# Patient Record
Sex: FEMALE | Race: ASIAN | NOT HISPANIC OR LATINO | URBAN - METROPOLITAN AREA
[De-identification: names, ages, dates, MRNs, and addresses within clinical notes are randomized per-mention and may not be internally consistent; named-entity substitution may affect disease eponyms.]

---

## 2018-10-15 ENCOUNTER — EMERGENCY (EMERGENCY)
Facility: HOSPITAL | Age: 83
LOS: 1 days | Discharge: HOME | End: 2018-10-15
Attending: EMERGENCY MEDICINE | Admitting: EMERGENCY MEDICINE
Payer: MEDICARE

## 2018-10-15 VITALS
RESPIRATION RATE: 16 BRPM | HEART RATE: 78 BPM | OXYGEN SATURATION: 100 % | DIASTOLIC BLOOD PRESSURE: 72 MMHG | SYSTOLIC BLOOD PRESSURE: 143 MMHG | TEMPERATURE: 98 F

## 2018-10-15 LAB
ALBUMIN SERPL ELPH-MCNC: 4.6 G/DL — SIGNIFICANT CHANGE UP (ref 3.3–5)
ALP SERPL-CCNC: 82 U/L — SIGNIFICANT CHANGE UP (ref 40–120)
ALT FLD-CCNC: 18 U/L — SIGNIFICANT CHANGE UP (ref 4–33)
APPEARANCE UR: CLEAR — SIGNIFICANT CHANGE UP
AST SERPL-CCNC: 18 U/L — SIGNIFICANT CHANGE UP (ref 4–32)
BASOPHILS # BLD AUTO: 0.03 K/UL — SIGNIFICANT CHANGE UP (ref 0–0.2)
BASOPHILS NFR BLD AUTO: 0.2 % — SIGNIFICANT CHANGE UP (ref 0–2)
BILIRUB SERPL-MCNC: 0.3 MG/DL — SIGNIFICANT CHANGE UP (ref 0.2–1.2)
BILIRUB UR-MCNC: NEGATIVE — SIGNIFICANT CHANGE UP
BLOOD UR QL VISUAL: NEGATIVE — SIGNIFICANT CHANGE UP
BUN SERPL-MCNC: 16 MG/DL — SIGNIFICANT CHANGE UP (ref 7–23)
CALCIUM SERPL-MCNC: 9.9 MG/DL — SIGNIFICANT CHANGE UP (ref 8.4–10.5)
CHLORIDE SERPL-SCNC: 93 MMOL/L — LOW (ref 98–107)
CO2 SERPL-SCNC: 20 MMOL/L — LOW (ref 22–31)
COLOR SPEC: SIGNIFICANT CHANGE UP
CREAT SERPL-MCNC: 0.72 MG/DL — SIGNIFICANT CHANGE UP (ref 0.5–1.3)
EOSINOPHIL # BLD AUTO: 0.14 K/UL — SIGNIFICANT CHANGE UP (ref 0–0.5)
EOSINOPHIL NFR BLD AUTO: 1.1 % — SIGNIFICANT CHANGE UP (ref 0–6)
GLUCOSE SERPL-MCNC: 143 MG/DL — HIGH (ref 70–99)
GLUCOSE UR-MCNC: NEGATIVE — SIGNIFICANT CHANGE UP
HCT VFR BLD CALC: 35.6 % — SIGNIFICANT CHANGE UP (ref 34.5–45)
HGB BLD-MCNC: 12.1 G/DL — SIGNIFICANT CHANGE UP (ref 11.5–15.5)
IMM GRANULOCYTES # BLD AUTO: 0.07 # — SIGNIFICANT CHANGE UP
IMM GRANULOCYTES NFR BLD AUTO: 0.5 % — SIGNIFICANT CHANGE UP (ref 0–1.5)
KETONES UR-MCNC: NEGATIVE — SIGNIFICANT CHANGE UP
LEUKOCYTE ESTERASE UR-ACNC: NEGATIVE — SIGNIFICANT CHANGE UP
LYMPHOCYTES # BLD AUTO: 1.13 K/UL — SIGNIFICANT CHANGE UP (ref 1–3.3)
LYMPHOCYTES # BLD AUTO: 8.5 % — LOW (ref 13–44)
MCHC RBC-ENTMCNC: 30.6 PG — SIGNIFICANT CHANGE UP (ref 27–34)
MCHC RBC-ENTMCNC: 34 % — SIGNIFICANT CHANGE UP (ref 32–36)
MCV RBC AUTO: 90.1 FL — SIGNIFICANT CHANGE UP (ref 80–100)
MONOCYTES # BLD AUTO: 0.82 K/UL — SIGNIFICANT CHANGE UP (ref 0–0.9)
MONOCYTES NFR BLD AUTO: 6.2 % — SIGNIFICANT CHANGE UP (ref 2–14)
NEUTROPHILS # BLD AUTO: 11.03 K/UL — HIGH (ref 1.8–7.4)
NEUTROPHILS NFR BLD AUTO: 83.5 % — HIGH (ref 43–77)
NITRITE UR-MCNC: NEGATIVE — SIGNIFICANT CHANGE UP
NRBC # FLD: 0 — SIGNIFICANT CHANGE UP
PH UR: 7 — SIGNIFICANT CHANGE UP (ref 5–8)
PLATELET # BLD AUTO: 291 K/UL — SIGNIFICANT CHANGE UP (ref 150–400)
PMV BLD: 10.4 FL — SIGNIFICANT CHANGE UP (ref 7–13)
POTASSIUM SERPL-MCNC: 5 MMOL/L — SIGNIFICANT CHANGE UP (ref 3.5–5.3)
POTASSIUM SERPL-SCNC: 5 MMOL/L — SIGNIFICANT CHANGE UP (ref 3.5–5.3)
PROT SERPL-MCNC: 8.1 G/DL — SIGNIFICANT CHANGE UP (ref 6–8.3)
PROT UR-MCNC: 10 — SIGNIFICANT CHANGE UP
RBC # BLD: 3.95 M/UL — SIGNIFICANT CHANGE UP (ref 3.8–5.2)
RBC # FLD: 12.6 % — SIGNIFICANT CHANGE UP (ref 10.3–14.5)
SODIUM SERPL-SCNC: 132 MMOL/L — LOW (ref 135–145)
SP GR SPEC: 1.01 — SIGNIFICANT CHANGE UP (ref 1–1.04)
TROPONIN T, HIGH SENSITIVITY: 10 NG/L — SIGNIFICANT CHANGE UP (ref ?–14)
TROPONIN T, HIGH SENSITIVITY: 9 NG/L — SIGNIFICANT CHANGE UP (ref ?–14)
UROBILINOGEN FLD QL: NORMAL — SIGNIFICANT CHANGE UP
WBC # BLD: 13.22 K/UL — HIGH (ref 3.8–10.5)
WBC # FLD AUTO: 13.22 K/UL — HIGH (ref 3.8–10.5)

## 2018-10-15 PROCEDURE — 70450 CT HEAD/BRAIN W/O DYE: CPT | Mod: 26

## 2018-10-15 PROCEDURE — 71045 X-RAY EXAM CHEST 1 VIEW: CPT | Mod: 26

## 2018-10-15 PROCEDURE — 93880 EXTRACRANIAL BILAT STUDY: CPT | Mod: 26

## 2018-10-15 PROCEDURE — 99218: CPT | Mod: GC

## 2018-10-15 RX ORDER — METFORMIN HYDROCHLORIDE 850 MG/1
500 TABLET ORAL THREE TIMES A DAY
Qty: 0 | Refills: 0 | Status: DISCONTINUED | OUTPATIENT
Start: 2018-10-15 | End: 2018-10-19

## 2018-10-15 RX ORDER — LOSARTAN POTASSIUM 100 MG/1
25 TABLET, FILM COATED ORAL
Qty: 0 | Refills: 0 | Status: DISCONTINUED | OUTPATIENT
Start: 2018-10-15 | End: 2018-10-19

## 2018-10-15 RX ORDER — SODIUM CHLORIDE 9 MG/ML
1000 INJECTION INTRAMUSCULAR; INTRAVENOUS; SUBCUTANEOUS ONCE
Qty: 0 | Refills: 0 | Status: COMPLETED | OUTPATIENT
Start: 2018-10-15 | End: 2018-10-15

## 2018-10-15 RX ADMIN — SODIUM CHLORIDE 1000 MILLILITER(S): 9 INJECTION INTRAMUSCULAR; INTRAVENOUS; SUBCUTANEOUS at 14:50

## 2018-10-15 RX ADMIN — LOSARTAN POTASSIUM 25 MILLIGRAM(S): 100 TABLET, FILM COATED ORAL at 19:41

## 2018-10-15 NOTE — ED ADULT NURSE NOTE - OBJECTIVE STATEMENT
Pt to bed 27. Alert and oriented x 3. Pt states that she felt dizzy and slid herself to the floor. Denies any LOC. Pt family at bedside. Bloods drawn. Safety maintained. Will continue to monitor.

## 2018-10-15 NOTE — ED ADULT NURSE NOTE - NSIMPLEMENTINTERV_GEN_ALL_ED
Implemented All Fall with Harm Risk Interventions:  [place] to call system. Call bell, personal items and telephone within reach. Instruct patient to call for assistance. Room bathroom lighting operational. Non-slip footwear when patient is off stretcher. Physically safe environment: no spills, clutter or unnecessary equipment. Stretcher in lowest position, wheels locked, appropriate side rails in place. Provide visual cue, wrist band, yellow gown, etc. Monitor gait and stability. Monitor for mental status changes and reorient to person, place, and time. Review medications for side effects contributing to fall risk. Reinforce activity limits and safety measures with patient and family. Provide visual clues: red socks.

## 2018-10-15 NOTE — ED PROVIDER NOTE - OBJECTIVE STATEMENT
84y f w PMhx HTN, DM (on metformin), CAD (no hx of MI or stents, not on AC), p/w an episode of syncope earlier this afternoon. Pt was in the kitchen and stood up quickly; she then began to feel dizzy and sat down on the floor, after which she lost consciousness for several minutes. She does not remember this happening; per family she also lost continence and defecated on herself during the episode. After awakening she had no post-ictal period. She denies and CP or SOB during or after the incident, denies any headache then or now. She has ambulated since the event.

## 2018-10-15 NOTE — ED PROVIDER NOTE - MEDICAL DECISION MAKING DETAILS
Syncopal episode, likely orthostatic, no associated headache, visual changes, CP, or SOB. Pt now asymptomatic. If all studies WNL will suggest CDU for echo in setting of syncope w/u, as pt has not had a cardiac evaluation in past -Jannet

## 2018-10-15 NOTE — ED CDU PROVIDER INITIAL DAY NOTE - ATTENDING CONTRIBUTION TO CARE
MD Kruse:  I have personally performed a face to face diagnostic evaluation on this patient with the PA.  I have reviewed the ACP note and agree with the history, exam, and plan of care, except as noted.  History and Exam by me shows 83 yo F syncope this AM.  Context: unwitnessed.  Duration LOC "7 minutes."  Associated Sx: no cp/sob, no HA, no weakness/numbness, no f/c, no abd pain, no back pain.  Better/worse:  feels fine now.  Quality:  of note, patient experienced a similar episode 4yrs ago, with no clear etiology.  In the ED he underwent cbc, cmp, UA, delta-trop (< 3), CT head (prelim result pending), carotid duplexes (result pending).  VS: wnl. Physical Exam: adult F, NAD, NCAT, neck supple, CTA B, RRR +systolic murmur.  Abd: s/nd/nt. Ext: no edema.  impression:  syncope of unclear etiology, possible aortic stenosis.  Plan:  TTE, tele, f/u carotid duplex and CT results, reassess.

## 2018-10-15 NOTE — ED ADULT TRIAGE NOTE - CHIEF COMPLAINT QUOTE
Pt s/o syncopal episode at home while sitting, pt states that she felt dizzy prior to episode. Pt denies passing out but family states that she did. PMH HTN high lipid, DM

## 2018-10-15 NOTE — ED CDU PROVIDER INITIAL DAY NOTE - OBJECTIVE STATEMENT
84y f w PMhx HTN, DM (on metformin), CAD (no hx of MI or stents, not on AC), p/w an episode of syncope earlier this afternoon. Pt was in the kitchen and stood up quickly; she then began to feel dizzy and sat down on the floor, after which she lost consciousness for several minutes. She does not remember this happening; per family she also lost continence and defecated on herself during the episode. After awakening she had no post-ictal period. She denies and CP or SOB during or after the incident, denies any headache then or now. She has ambulated since the event.    CDU SHARON Hernandez: Agree with above, 85 Y/O F PMH HTN DM CAD presented with syncopal episode. Pt states she stood up quickly, pt lost consciousness for several minutes as per family, pt denies LOC. Pt denies trauma, pt states she feels well. Pt to CDU for echo, overnight telemtry and continued evaluation.

## 2018-10-15 NOTE — ED CDU PROVIDER INITIAL DAY NOTE - MEDICAL DECISION MAKING DETAILS
impression:  prolonged syncope of unclear etiology, possible aortic stenosis.    Plan:  TTE, tele, f/u carotid duplex and CT results, reassess.

## 2018-10-16 VITALS
HEART RATE: 80 BPM | RESPIRATION RATE: 16 BRPM | SYSTOLIC BLOOD PRESSURE: 154 MMHG | TEMPERATURE: 98 F | DIASTOLIC BLOOD PRESSURE: 63 MMHG | OXYGEN SATURATION: 98 %

## 2018-10-16 PROCEDURE — 93306 TTE W/DOPPLER COMPLETE: CPT | Mod: 26

## 2018-10-16 PROCEDURE — 99217: CPT | Mod: GC

## 2018-10-16 RX ADMIN — LOSARTAN POTASSIUM 25 MILLIGRAM(S): 100 TABLET, FILM COATED ORAL at 10:15

## 2018-10-16 RX ADMIN — METFORMIN HYDROCHLORIDE 500 MILLIGRAM(S): 850 TABLET ORAL at 10:15

## 2018-10-16 NOTE — ED CDU PROVIDER SUBSEQUENT DAY NOTE - ATTENDING CONTRIBUTION TO CARE
Dr. Ty: I performed a face to face bedside interview with patient regarding history of present illness, review of symptoms and past medical history. I completed an independent physical exam.  I have discussed patient's plan of care with PA.   I agree with note as stated above, having amended the EMR as needed to reflect my findings.   This includes HISTORY OF PRESENT ILLNESS, HIV, PAST MEDICAL/SURGICAL/FAMILY/SOCIAL HISTORY, ALLERGIES AND HOME MEDICATIONS, REVIEW OF SYSTEMS, PHYSICAL EXAM, and any PROGRESS NOTES during the time I functioned as the attending physician for this patient. 84F h/o htn, hld, dm, cad no stents s/p syncope v. near syncope. Yesterday afternoon patient in kitchen felt dizzy, sat down on floor, per family patient loc. Neg incontinence or post ictal symptoms. Yest carotid duplex neg. CDU for tele monitoring and echo. Overnight neg issues. Dr. Ty: I performed a face to face bedside interview with patient regarding history of present illness, review of symptoms and past medical history. I completed an independent physical exam.  I have discussed patient's plan of care with PA.   I agree with note as stated above, having amended the EMR as needed to reflect my findings.   This includes HISTORY OF PRESENT ILLNESS, HIV, PAST MEDICAL/SURGICAL/FAMILY/SOCIAL HISTORY, ALLERGIES AND HOME MEDICATIONS, REVIEW OF SYSTEMS, PHYSICAL EXAM, and any PROGRESS NOTES during the time I functioned as the attending physician for this patient. 84F h/o htn, hld, dm, cad no stents s/p syncope v. near syncope. Yesterday afternoon patient in kitchen felt dizzy, spinning, sat down on floor, per family patient loc. Neg incontinence or post ictal symptoms. Yest carotid duplex neg. CDU for tele monitoring and echo. Overnight neg issues. AM neg complaints and unremarkable exam.

## 2018-10-16 NOTE — ED CDU PROVIDER DISPOSITION NOTE - CLINICAL COURSE
84F h/o htn, hld, dm, cad no stents s/p syncope v. near syncope. Yesterday afternoon patient in kitchen felt dizzy, spinning, sat down on floor, per family patient loc. Neg incontinence or post ictal symptoms. Yest carotid duplex neg. CDU for tele monitoring and echo. Overnight neg issues. Unremarkable PE. Cardiac echo indicates mild to moderate aortic stenosis. PCP Tarun Truong made aware. Results, dc instructions, return precautions, and need for follow-up reviewed with patient. Stable dc home.

## 2018-10-16 NOTE — ED CDU PROVIDER DISPOSITION NOTE - PLAN OF CARE
Advance activity as tolerated.  Continue all previously prescribed medications as directed unless otherwise instructed.  Follow up with your primary care physician and cardiology (referral list provided)  in 48-72 hours- bring copies of your results.  Return to the ER for worsening or persistent symptoms, including but not limited to worsening/persistent lightheadedness, chest pain, palpitations, passing out, and/or ANY NEW OR CONCERNING SYMPTOMS. If you have issues obtaining follow up, please call: [phone] to obtain a doctor or specialist who takes your insurance in your area.  You may call [phone] to make an appointment with the internal medicine clinic.

## 2018-10-16 NOTE — ED CDU PROVIDER SUBSEQUENT DAY NOTE - PROGRESS NOTE DETAILS
Pt notes feeling well.  Pt denies any recurrence of lightheadedness.  Denies any chest pain, SOB, palpitations.  Echo shows mild to moderate aortic stenosis with EF 66%.  Discussed results with pt and pt's pmd Dr. Blancas who agrees with outpatient cardiology follow up.  Pt medically stable for discharge.  Pt to follow up with PMD and cardiology (referral list provided).

## 2018-10-16 NOTE — ED CDU PROVIDER SUBSEQUENT DAY NOTE - PMH
Diabetes    HLD (hyperlipidemia)    HTN (hypertension) Coronary artery disease    Diabetes    HLD (hyperlipidemia)    HTN (hypertension)

## 2018-10-16 NOTE — ED CDU PROVIDER SUBSEQUENT DAY NOTE - HISTORY
84F h/o htn, hld, dm, cad no stents s/p syncope v. near syncope. Yesterday afternoon patient in kitchen felt dizzy, sat down on floor, per family patient loc. Neg incontinence or post ictal symptoms. Yest carotid duplex neg. CDU for tele monitoring and echo. Overnight neg issues. 84F h/o htn, hld, dm, cad no stents s/p syncope v. near syncope. Yesterday afternoon patient in kitchen felt dizzy, sat down on floor, per family patient loc. Neg incontinence or post ictal symptoms. Yest carotid duplex neg. CDU for tele monitoring and echo. Overnight neg issues.    CDU SHARON Allen Note----  85 yo female, PMH of CAD, DM, HTN, hyperlipidemia, who presented to the ED s/p syncopal episode (pt had stood up quickly, felt dizzy, sat down on floor, and then had syncopal episode.  No incontinence of bowel or bladder at the time; family witnessed event; no postictal period described.  Pt. was evaluated in the ED and dispo'd to CDU for further management plan:  tele monitoring, carotid duplex, echo, general observation care / monitoring.  In the interim, carotid duplex showed "Mild heterogenous plaque noted within the proximal right and left internal carotid arteries, consistent with 16-49% stenoses in both proximal vessels.  No hemodynamically significant stenoses noted.", no issues or c/o in the interim from 10/15 evening sign-out to the present time thus far.

## 2018-10-16 NOTE — ED CDU PROVIDER DISPOSITION NOTE - ATTENDING CONTRIBUTION TO CARE
Dr. Ty: I performed a face to face bedside interview with patient regarding history of present illness, review of symptoms and past medical history. I completed an independent physical exam.  I have discussed patient's plan of care with PA.   I agree with note as stated above, having amended the EMR as needed to reflect my findings.   This includes HISTORY OF PRESENT ILLNESS, HIV, PAST MEDICAL/SURGICAL/FAMILY/SOCIAL HISTORY, ALLERGIES AND HOME MEDICATIONS, REVIEW OF SYSTEMS, PHYSICAL EXAM, and any PROGRESS NOTES during the time I functioned as the attending physician for this patient. 84F h/o htn, hld, dm, cad no stents s/p syncope v. near syncope. Yesterday afternoon patient in kitchen felt dizzy, spinning, sat down on floor, per family patient loc. Neg incontinence or post ictal symptoms. Yest carotid duplex neg. CDU for tele monitoring and echo. Overnight neg issues. Unremarkable PE. Cardiac echo indicates mild to moderate aortic stenosis. PCP Tarun Truong made aware. Results, dc instructions, return precautions, and need for follow-up reviewed with patient. Stable dc home.

## 2018-10-17 LAB
BACTERIA UR CULT: SIGNIFICANT CHANGE UP
SPECIMEN SOURCE: SIGNIFICANT CHANGE UP

## 2022-06-11 NOTE — ED PROVIDER NOTE - CONSTITUTIONAL, MLM
normal... Well appearing, well nourished, awake, alert, oriented to person, place, time/situation and in no apparent distress. [facility]

## 2023-06-29 NOTE — ED PROVIDER NOTE - ATTENDING CONTRIBUTION TO CARE
At least 3/5 B/L UE and 3-/5 BLE/grossly assessed due to
junior: pt had episode of syncope today along with uncontrolled defecation. denies trauma.  brought to ED.   had episode of syncope several years ago with inpt w/u.  exam: slightly unsteady gait (family states this is baseline and pt is supposed to be walking with a cane.  exam otherwise unremarkable.   impression; syncope. recc hospitalization for observation and further testing.

## 2024-08-27 NOTE — ED CDU PROVIDER DISPOSITION NOTE - NS ED ATTENDING STATEMENT MOD
[facility]    Consult               Today's Date: 8/27/2024  Patient Name: Krystina Han  Date of admission: 8/26/2024  2:22 PM  Patient's age: 70 y.o., 1953  Admission Dx: Syncope and collapse [R55]  Syncope, near [R55]    Requesting Physician: Andi Nation MD    Cardiac Evaluation Reason:  Syncope    History Obtained From: patient and chart review     History of Present Illness:    Patient 70 y.o. Female with past medical history of HTN and HLP being consulted for pre-syncopal episode that occurred yesterday. Patient states she was at her podiatrist office for 1 week surgical follow-up of hammertoe correction yesterday, when suddenly while seated her vision became blurry and \"felt like blacking out\". Patient denies trying to stand from sitting or moving before insidious onset of blurry vision and near-syncope. Patient states loss of consciousness 1 week ago while at a restaurant while seated, witnessed by her family. Patient endorses prodromal of narrowing and blurry vision before syncope 1 week ago. Patient denies aggravating and alleviating factors of 2 prior syncopal episodes. Patient endorsed blurry vision and feeling warm yesterday during pre-syncopal, and history of dizziness upon standing occasionally. Patient denies headache, change in hearing, diaphoresis, fatigue, palpitations, tachycardia, edema, coughing, wheezing, N/V/D/C, abdominal pain, paresthesias, and numbness.    Past Medical History:   has a past medical history of Head injury, Hearing loss, Hypertension, PONV (postoperative nausea and vomiting), Prolonged emergence from general anesthesia, and Shingles.    Past Surgical History:   has a past surgical history that includes Breast biopsy (Left, 2013); Breast lumpectomy (Left, 2013); Cholecystectomy; Hysterectomy; [place] tooth extraction; Colonoscopy; eye surgery (Bilateral, 05/25/2021); Foot surgery (Right, 8/19/2024); and Nail Surgery (Right, 8/19/2024).     Home  surgery on 08/19/2024 for correction of R 2nd phalange hammertoe, reduce weight bearing on R leg. No weakness or joint complaints.  Neurological: No headache, diplopia, change in muscle strength, numbness or tingling. No change in gate.   Endocrine: No temperature intolerance. No excessive thirst, fluid intake, or urination. No tremor.  Hematologic/Lymphatic: No abnormal bruising or bleeding    PHYSICAL EXAM:      BP (!) 144/86   Pulse 56   Temp 97.7 °F (36.5 °C) (Oral)   Resp 18   Ht 1.676 m (5' 6\")   Wt 78.5 kg (173 lb)   SpO2 100%   BMI 27.92 kg/m²    Constitutional and General Appearance: alert, cooperative, in no distress   HEENT: atraumatic, normocephalic.   Respiratory:  Clear to auscultation bilaterally  Cardiovascular:  Regular S1 and S2.  No JVD  Radial pulses are symmetrical and full   Abdomen:   Soft, non tender   Bowel sounds present  Extremities:  R Le with ace-bandage from inferior third of shin to MTPs, surgical fixated 2nd phalange  Neurological:  Deferred     LABS:   CBC:   Recent Labs     08/26/24  1440   WBC 7.9   HGB 12.0   HCT 35.7*        BMP:   Recent Labs     08/26/24  1440      K 3.4*   CO2 26   BUN 18   CREATININE 1.3*   LABGLOM 43*   GLUCOSE 119*     BNP: No results for input(s): \"BNP\" in the last 72 hours.  PT/INR: No results for input(s): \"PROTIME\", \"INR\" in the last 72 hours.  APTT:No results for input(s): \"APTT\" in the last 72 hours.  CARDIAC ENZYMES: Troponin negative times 2 (8 ng/L, 9 ng/L).    DATA:    EKG:   Sinus bradycardia, LAE, LVH, t-wave abnormalities, abnormal ECG    CXR:   Obtained and reviewed   (08/26/2024).  No acute cardiopulmonary process.    ECHO:   obtained and reviewed.   (02/27/2019)  · Normally contractile left ventricle.   · There are no significant valve abnormalities.   · There is no evidence of atrial level shunt.     Stress Test:   not obtained.    Cardiac Angiography:   not obtained.      ASSESSMENT:  Syncope/pre-syncopal  I have personally performed a face to face diagnostic evaluation on this patient. I have reviewed the ACP note and agree with the history, exam and plan of care, except as noted.

## 2024-11-01 ENCOUNTER — EMERGENCY (EMERGENCY)
Facility: HOSPITAL | Age: 89
LOS: 1 days | Discharge: HOME | End: 2024-11-01
Attending: STUDENT IN AN ORGANIZED HEALTH CARE EDUCATION/TRAINING PROGRAM | Admitting: STUDENT IN AN ORGANIZED HEALTH CARE EDUCATION/TRAINING PROGRAM
Payer: MEDICARE

## 2024-11-01 VITALS
RESPIRATION RATE: 18 BRPM | HEART RATE: 95 BPM | TEMPERATURE: 97 F | WEIGHT: 100.09 LBS | DIASTOLIC BLOOD PRESSURE: 75 MMHG | OXYGEN SATURATION: 98 % | SYSTOLIC BLOOD PRESSURE: 178 MMHG

## 2024-11-01 LAB
HCT VFR BLD CALC: 25 % — LOW (ref 34.5–45)
HGB BLD-MCNC: 8.6 G/DL — LOW (ref 11.5–15.5)
IANC: 9.02 K/UL — HIGH (ref 1.8–7.4)
MCHC RBC-ENTMCNC: 30.6 PG — SIGNIFICANT CHANGE UP (ref 27–34)
MCHC RBC-ENTMCNC: 34.4 G/DL — SIGNIFICANT CHANGE UP (ref 32–36)
MCV RBC AUTO: 89 FL — SIGNIFICANT CHANGE UP (ref 80–100)
PLATELET # BLD AUTO: 304 K/UL — SIGNIFICANT CHANGE UP (ref 150–400)
RBC # BLD: 2.81 M/UL — LOW (ref 3.8–5.2)
RBC # FLD: 13.2 % — SIGNIFICANT CHANGE UP (ref 10.3–14.5)
WBC # BLD: 11.35 K/UL — HIGH (ref 3.8–10.5)
WBC # FLD AUTO: 11.35 K/UL — HIGH (ref 3.8–10.5)

## 2024-11-01 PROCEDURE — 72170 X-RAY EXAM OF PELVIS: CPT | Mod: 26

## 2024-11-01 PROCEDURE — 93010 ELECTROCARDIOGRAM REPORT: CPT

## 2024-11-01 PROCEDURE — 99285 EMERGENCY DEPT VISIT HI MDM: CPT

## 2024-11-01 PROCEDURE — 71045 X-RAY EXAM CHEST 1 VIEW: CPT | Mod: 26

## 2024-11-01 RX ORDER — ACETAMINOPHEN 500 MG
675 TABLET ORAL ONCE
Refills: 0 | Status: COMPLETED | OUTPATIENT
Start: 2024-11-01 | End: 2024-11-01

## 2024-11-01 RX ORDER — QUETIAPINE FUMARATE 200 MG/1
50 TABLET ORAL ONCE
Refills: 0 | Status: COMPLETED | OUTPATIENT
Start: 2024-11-01 | End: 2024-11-01

## 2024-11-01 RX ADMIN — QUETIAPINE FUMARATE 50 MILLIGRAM(S): 200 TABLET ORAL at 23:56

## 2024-11-01 RX ADMIN — Medication 270 MILLIGRAM(S): at 22:43

## 2024-11-01 NOTE — ED PROVIDER NOTE - PROGRESS NOTE DETAILS
Светлана Clement MD (PGY-3 EM): discussed Hg, patient baseline anemic, takes iron, will fu with pmd, discussed Na, patient take sodium tablets, will follow up with pmd. patient will fu with cards for possible syncope.

## 2024-11-01 NOTE — ED PROVIDER NOTE - PATIENT PORTAL LINK FT
You can access the FollowMyHealth Patient Portal offered by [facility] [place] by registering at the following website: [email or web address]. By joining [employer]’s FollowMyHealth portal, you will also be able to view your health information using other applications (apps) compatible with our system.

## 2024-11-01 NOTE — ED PROVIDER NOTE - NSFOLLOWUPINSTRUCTIONS_ED_ALL_ED_FT
please follow up with your cardiologist within a week,     Concussion    WHAT YOU NEED TO KNOW:    A concussion is a mild brain injury. It is usually caused by a bump or blow to the head from a fall, a motor vehicle crash, or a sports injury. Sometimes being shaken forcefully may cause a concussion.    DISCHARGE INSTRUCTIONS:    Have someone call 911 for any of the following:   •You cannot be woken.  •You have a seizure, increasing confusion, or a change in personality.  •Your speech becomes slurred, or you have new vision problems.      Seek care immediately if:   •You have sudden and new vision problems.  •You have a severe headache that does not go away.  •You have arm or leg weakness, numbness, or new problems with coordination.  •You have blood or clear fluid coming out of the ears or nose.      Contact your healthcare provider if:   •You have nausea or are vomiting.  •You feel more sleepy than usual.  •Your symptoms get worse.  •Your symptoms last longer than 6 weeks after the injury.  •You have questions or concerns about your condition or care.      Medicines: You may need any of the following:   •Acetaminophen decreases pain and fever. It is available without a doctor's order. Ask how much to take and how often to take it. Follow directions. Read the labels of all other medicines you are using to see if they also contain acetaminophen, or ask your doctor or pharmacist. Acetaminophen can cause liver damage if not taken correctly. Do not use more than 4 grams (4,000 milligrams) total of acetaminophen in one day.   •NSAIDs help decrease swelling and pain or fever. This medicine is available with or without a doctor's order. NSAIDs can cause stomach bleeding or kidney problems in certain people. If you take blood thinner medicine, always ask your healthcare provider if NSAIDs are safe for you. Always read the medicine label and follow directions.  •Take your medicine as directed. Contact your healthcare provider if you think your medicine is not helping or if you have side effects. Tell him or her if you are allergic to any medicine. Keep a list of the medicines, vitamins, and herbs you take. Include the amounts, and when and why you take them. Bring the list or the pill bottles to follow-up visits. Carry your medicine list with you in case of an emergency.      Self-care: Concussion symptoms usually go away within about 10 days, but they may last longer. The following may be recommended to manage your symptoms:   •Rest from physical and mental activities as directed. Mental activities are those that require thinking, concentration, and attention. You will need to rest until your symptoms are gone. Rest will allow you to recover from your concussion. Ask your healthcare provider when you can return to work and other daily activities.  •Have someone stay with you for the first 24 hours after your injury. Your healthcare provider should be contacted if your symptoms get worse, or you develop new symptoms.  •Do not participate in sports and physical activities until your healthcare provider says it is okay. They could make your symptoms worse or lead to another concussion. Your healthcare provider will tell you when it is okay for you to return to sports or physical activities. Ask for more information about sports concussions.    Prevent another concussion:   •Wear protective sports equipment that fits properly. Helmets help decrease your risk for a serious brain injury. Talk to your healthcare provider about ways that can decrease your risk for a concussion if you play sports.  •Wear your seatbelt every time you travel. This helps to decrease your risk for a head injury if you are in a car accident.       Advance activity as tolerated.  Continue all previously prescribed medications as directed unless otherwise instructed.  Follow up with your primary care physician in 48-72 hours- bring copies of your results.  Return to the ER for worsening or persistent symptoms, and/or ANY NEW OR CONCERNING SYMPTOMS. If you have issues obtaining follow up, please call: [phone] to obtain a doctor or specialist who takes your insurance in your area.  You may call [phone] to make an appointment with the internal medicine clinic.    Concussion Program	Concussion phone number: [phone]

## 2024-11-01 NOTE — ED ADULT TRIAGE NOTE - CHIEF COMPLAINT QUOTE
(ems gave 300cc IVF# 20 left a/c in field) b/p in field 62/32 b/p went up to 172/78  + covid 2 weeks ago   baseline of confusion pt is more alert and has hx of dementia  f/s in field 242

## 2024-11-01 NOTE — ED PROVIDER NOTE - CLINICAL SUMMARY MEDICAL DECISION MAKING FREE TEXT BOX
89y/o female past medical history significant for diabetes, CAD, not on aspirin), hypertension presents emergency department status post fall.  As per daughter, patient got up was mildly confused, fell and hit her head.  Patient did not lose consciousness.  Patient has no current complaint.  Patient has no visual changes, nausea, vomiting. patient on on AC/ASA. patient has history of syncope, has had workup w/ cards who recommended further and "implantation of device", in which she decline. patient and family agreeable to imaging and labs, however does not want further syncope workup, PE: alert and oriented, echymosis to R side of face, w/ R sided Subconjunctival hemorrhage, no palpable occipital hematoma, no midline spine tenderness, no chest wall tenderness, pelvis intact, ext atraumatic w/ full ROM w/ ecchymosis to R anterior hand, non tender. lungs CTA, cardiac regular rhythm. will get CTH and cervical to eval frx vs ICH. will get screening labs and XR of chest/ pelvis, dispo pending workup.
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
## 2024-11-01 NOTE — ED ADULT NURSE NOTE - OBJECTIVE STATEMENT
Pt received to room 8 a&ox2, ambulatory at baseline s/p fall on asa and hypotension. Pmh - DM, HTN, HLD, CAD. On assessment pt noted to have large facial ecchymosis to b/l eyes, forehead, and right shoulder. Pts neice at baseside. Pt received to room 8 a&ox2, ambulatory at baseline s/p fall on asa and hypotension. Pmh - dementia, DM, HTN, HLD, CAD. On assessment pt noted to have large facial ecchymosis to b/l eyes, forehead, and right shoulder. Pts niece at bedside states she had fall last week and was seen by PCP for symptoms. Respirations even and unlabored. Pt placed on cardiac monitor. NSR noted. Abdomen soft, nondistended, nontender to touch. Skin warm, dry, and intact. No edema noted. Pt denies chest pain, shortness of breath, nausea, vomiting, fevers, chills.  20g IV placed to left AC. Labs collected and sent. Will continue to monitor.

## 2024-11-01 NOTE — ED PROVIDER NOTE - NSICDXPASTMEDICALHX_GEN_ALL_CORE_FT
PAST MEDICAL HISTORY:  Coronary artery disease     Diabetes     HLD (hyperlipidemia)     HTN (hypertension)

## 2024-11-01 NOTE — ED ADULT NURSE NOTE - NSFALLHARMRISKINTERV_ED_ALL_ED
Assistance OOB with selected safe patient handling equipment if applicable/Assistance with ambulation/Communicate risk of Fall with Harm to all staff, patient, and family/Monitor for mental status changes and reorient to person, place, and time, as needed/Move patient closer to nursing station/within visual sight of ED staff/Provide visual cue: red socks, yellow wristband, yellow gown, etc/Reinforce activity limits and safety measures with patient and family/Toileting schedule using arm’s reach rule for commode and bathroom/Use of alarms - bed, stretcher, chair and/or video monitoring/Bed in lowest position, wheels locked, appropriate side rails in place/Call bell, personal items and telephone in reach/Instruct patient to call for assistance before getting out of bed/chair/stretcher/Non-slip footwear applied when patient is off stretcher/[place] to call system/Physically safe environment - no spills, clutter or unnecessary equipment/Purposeful Proactive Rounding/Room/bathroom lighting operational, light cord in reach

## 2024-11-02 VITALS
RESPIRATION RATE: 16 BRPM | HEART RATE: 88 BPM | TEMPERATURE: 98 F | SYSTOLIC BLOOD PRESSURE: 137 MMHG | DIASTOLIC BLOOD PRESSURE: 61 MMHG | OXYGEN SATURATION: 100 %

## 2024-11-02 PROBLEM — I25.10 ATHEROSCLEROTIC HEART DISEASE OF NATIVE CORONARY ARTERY WITHOUT ANGINA PECTORIS: Chronic | Status: ACTIVE | Noted: 2018-10-16

## 2024-11-02 LAB
ALBUMIN SERPL ELPH-MCNC: 3.8 G/DL — SIGNIFICANT CHANGE UP (ref 3.3–5)
ALP SERPL-CCNC: 107 U/L — SIGNIFICANT CHANGE UP (ref 40–120)
ALT FLD-CCNC: 27 U/L — SIGNIFICANT CHANGE UP (ref 4–33)
ANION GAP SERPL CALC-SCNC: 13 MMOL/L — SIGNIFICANT CHANGE UP (ref 7–14)
APPEARANCE UR: CLEAR — SIGNIFICANT CHANGE UP
AST SERPL-CCNC: 31 U/L — SIGNIFICANT CHANGE UP (ref 4–32)
BASOPHILS # BLD AUTO: 0.1 K/UL — SIGNIFICANT CHANGE UP (ref 0–0.2)
BASOPHILS NFR BLD AUTO: 0.9 % — SIGNIFICANT CHANGE UP (ref 0–2)
BILIRUB SERPL-MCNC: 0.4 MG/DL — SIGNIFICANT CHANGE UP (ref 0.2–1.2)
BILIRUB UR-MCNC: NEGATIVE — SIGNIFICANT CHANGE UP
BUN SERPL-MCNC: 24 MG/DL — HIGH (ref 7–23)
CALCIUM SERPL-MCNC: 8.5 MG/DL — SIGNIFICANT CHANGE UP (ref 8.4–10.5)
CHLORIDE SERPL-SCNC: 95 MMOL/L — LOW (ref 98–107)
CO2 SERPL-SCNC: 21 MMOL/L — LOW (ref 22–31)
COLOR SPEC: YELLOW — SIGNIFICANT CHANGE UP
CREAT SERPL-MCNC: 0.88 MG/DL — SIGNIFICANT CHANGE UP (ref 0.5–1.3)
DIFF PNL FLD: NEGATIVE — SIGNIFICANT CHANGE UP
EGFR: 62 ML/MIN/1.73M2 — SIGNIFICANT CHANGE UP
EOSINOPHIL # BLD AUTO: 0.19 K/UL — SIGNIFICANT CHANGE UP (ref 0–0.5)
EOSINOPHIL NFR BLD AUTO: 1.7 % — SIGNIFICANT CHANGE UP (ref 0–6)
GLUCOSE SERPL-MCNC: 190 MG/DL — HIGH (ref 70–99)
GLUCOSE UR QL: NEGATIVE MG/DL — SIGNIFICANT CHANGE UP
KETONES UR-MCNC: NEGATIVE MG/DL — SIGNIFICANT CHANGE UP
LEUKOCYTE ESTERASE UR-ACNC: NEGATIVE — SIGNIFICANT CHANGE UP
LIDOCAIN IGE QN: 47 U/L — SIGNIFICANT CHANGE UP (ref 7–60)
LYMPHOCYTES # BLD AUTO: 0.4 K/UL — LOW (ref 1–3.3)
LYMPHOCYTES # BLD AUTO: 3.5 % — LOW (ref 13–44)
MONOCYTES # BLD AUTO: 1.18 K/UL — HIGH (ref 0–0.9)
MONOCYTES NFR BLD AUTO: 10.4 % — SIGNIFICANT CHANGE UP (ref 2–14)
NEUTROPHILS # BLD AUTO: 9.38 K/UL — HIGH (ref 1.8–7.4)
NEUTROPHILS NFR BLD AUTO: 82.6 % — HIGH (ref 43–77)
NITRITE UR-MCNC: NEGATIVE — SIGNIFICANT CHANGE UP
PH UR: 6.5 — SIGNIFICANT CHANGE UP (ref 5–8)
POTASSIUM SERPL-MCNC: 5.2 MMOL/L — SIGNIFICANT CHANGE UP (ref 3.5–5.3)
POTASSIUM SERPL-SCNC: 5.2 MMOL/L — SIGNIFICANT CHANGE UP (ref 3.5–5.3)
PROT SERPL-MCNC: 7 G/DL — SIGNIFICANT CHANGE UP (ref 6–8.3)
PROT UR-MCNC: NEGATIVE MG/DL — SIGNIFICANT CHANGE UP
SODIUM SERPL-SCNC: 129 MMOL/L — LOW (ref 135–145)
SP GR SPEC: 1.01 — SIGNIFICANT CHANGE UP (ref 1–1.03)
TROPONIN T, HIGH SENSITIVITY RESULT: 24 NG/L — SIGNIFICANT CHANGE UP
TROPONIN T, HIGH SENSITIVITY RESULT: 25 NG/L — SIGNIFICANT CHANGE UP
UROBILINOGEN FLD QL: 0.2 MG/DL — SIGNIFICANT CHANGE UP (ref 0.2–1)

## 2024-11-02 PROCEDURE — 70490 CT SOFT TISSUE NECK W/O DYE: CPT | Mod: 26,MC

## 2024-11-02 PROCEDURE — 70450 CT HEAD/BRAIN W/O DYE: CPT | Mod: 26,MC

## 2024-11-02 NOTE — ED ADULT NURSE REASSESSMENT NOTE - NS ED NURSE REASSESS COMMENT FT1
Pt resting comfortably in bed offers no complaints at this time. Respirations even and unlabored. No signs of distress noted. Pt pending CT. Will continue to monitor.
Pt reports improvement in condition. Respirations even and unlabored. No signs of distress noted. VSS. IV removed. Pt stable for discharge.
